# Patient Record
Sex: MALE | Race: WHITE
[De-identification: names, ages, dates, MRNs, and addresses within clinical notes are randomized per-mention and may not be internally consistent; named-entity substitution may affect disease eponyms.]

---

## 2019-11-20 ENCOUNTER — HOSPITAL ENCOUNTER (OUTPATIENT)
Dept: HOSPITAL 84 - D.HCCECHO | Age: 54
Discharge: HOME | End: 2019-11-20
Attending: INTERNAL MEDICINE
Payer: COMMERCIAL

## 2019-11-20 DIAGNOSIS — I34.1: Primary | ICD-10-CM

## 2019-11-26 NOTE — EC
PATIENT:JOSEPH HAMMER                    DATE OF SERVICE: 11/20/19
SEX: M                                  MEDICAL RECORD: [id number]
DATE OF BIRTH: [birth year]                        [facility]          
AGE OF PATIENT: 53                             ADMISSION DATE: 11/20/19
 
REFERRING PHYSICIAN:                               
 
INTERPRETING PHYSICIAN: ALLY SERRA MD          
 
 
 
                             ECHOCARDIOGRAM REPORT
  ECHO CHARGES 4               ECHO COMPLETE                 Date: 11/20/19
 
 
 
CLINICAL DIAGNOSIS: MITRAL VALVE PROLAPSE         
 
                         ECHOCARDIOGRAPHIC MEASUREMENTS
      (adult normal given)
   AC root (d.<3.7cm) 3.3  cm   LV Septum d (<1.2 cm> 1.0  cm
      Valve Excursion 1.6  cm     LV Septum (systole) 1.3  cm
Left Atria (s.<4.0cm> 3.8  cm          LVPW d(<1.2cm) 1.3  cm
        RV (d.<2.3cm) 3.3  cm           LVPW (sytole) 1.7  cm
  LV diastole(<5.6CM) 5.4  cm       MV E-F(>70mm/sec)      cm
           LV systole 3.6  cm           LVOT Diameter 1.9  cm
       MV exc.(>10mm) 2.0  cm
Est.ejection fraction (50-75%)     %
 
   DOPPLER:
     LVIT      cm/sec A 70.0 cm/sec E 82.0  cm/sec
       LA      cm/sec      RVSP 25   mmHg
     LVOT 110  cm/sec   AOP1/2T      m/s
  Asc. Ao 129  cm/sec
     RVOT 57   cm/sec
       RA      cm/sec
         cm/sec
 AV Gradient Peak 6.65 mmHg  AV Mean 3.80 mmHg  AV Area 2.2  cm
 MV Gradient Peak 2.75 mmHg  MV Mean 1.18 mmHg  MV Area      cm
   COMMENTS:                                              
 
 
 Cardiac Sonographer: 2               SHANTANU LEVIN              
      Cardiologist: 3          Dr. Arrieta             
             TAPE# PACS           
                                       Pericardial Effusion N                        
 
 
DATE OF SERVICE:  
 
Adequate 2D, color flow, spectral Doppler, and M-mode.  No LVH.  LV internal
dimensions are normal.  Wall motion is normal.  EF is greater than or equal to
55%.  Aortic valve is tricuspid.  No evidence of stenosis by Doppler
interrogation.  Left atrium normal at 3.8 cm.  Mitral valve shows prolapse of
the posterior leaflet.  Mild plus MR.  Right-sided chambers are grossly normal. 
Trace TR by color flow imaging.
 
TRANSINT:[id number] Voice Confirmation ID: [id number] DOCUMENT ID: [id number]
 
 
 
ECHOCARDIOGRAM REPORT                          [id number]    JOSEPH HAMMER GREGORY A MD          
 
 
 
Electronically Signed by ALLY SERRA on 11/26/19 at 0848
 
 
 
 
 
 
 
 
 
 
 
 
 
 
 
 
 
 
 
 
 
 
 
 
 
 
 
 
 
 
 
 
 
 
 
 
 
 
 
 
CC:                                                             [id number]
DICTATION DATE: 11/21/19 1410     :     11/21/19 1532      DEP CLI 
                                                                      11/20/19
[id number] [place] [address]